# Patient Record
Sex: MALE | Race: WHITE | NOT HISPANIC OR LATINO
[De-identification: names, ages, dates, MRNs, and addresses within clinical notes are randomized per-mention and may not be internally consistent; named-entity substitution may affect disease eponyms.]

---

## 2017-04-26 ENCOUNTER — APPOINTMENT (OUTPATIENT)
Dept: ENDOCRINOLOGY | Facility: CLINIC | Age: 32
End: 2017-04-26

## 2018-09-04 ENCOUNTER — OUTPATIENT (OUTPATIENT)
Dept: OUTPATIENT SERVICES | Facility: HOSPITAL | Age: 33
LOS: 1 days | Discharge: STILL ADMITTED OR ADMINISTRATIVE | End: 2018-09-04

## 2018-09-05 DIAGNOSIS — E66.9 OBESITY, UNSPECIFIED: ICD-10-CM

## 2018-09-05 DIAGNOSIS — Z86.59 PERSONAL HISTORY OF OTHER MENTAL AND BEHAVIORAL DISORDERS: ICD-10-CM

## 2018-09-05 DIAGNOSIS — F33.9 MAJOR DEPRESSIVE DISORDER, RECURRENT, UNSPECIFIED: ICD-10-CM

## 2018-10-16 ENCOUNTER — EMERGENCY (EMERGENCY)
Facility: HOSPITAL | Age: 33
LOS: 1 days | Discharge: HOME | End: 2018-10-16
Attending: EMERGENCY MEDICINE | Admitting: EMERGENCY MEDICINE
Payer: MEDICAID

## 2018-10-16 VITALS
RESPIRATION RATE: 16 BRPM | SYSTOLIC BLOOD PRESSURE: 119 MMHG | DIASTOLIC BLOOD PRESSURE: 72 MMHG | OXYGEN SATURATION: 96 % | HEART RATE: 97 BPM | WEIGHT: 285.06 LBS | HEIGHT: 70 IN | TEMPERATURE: 99 F

## 2018-10-16 PROCEDURE — 99284 EMERGENCY DEPT VISIT MOD MDM: CPT

## 2018-10-16 PROCEDURE — 99281 EMR DPT VST MAYX REQ PHY/QHP: CPT

## 2018-10-16 NOTE — ED ADULT NURSE NOTE - OBJECTIVE STATEMENT
Patient requesting new prescription for Vyvanse 120mg BID. States he has been on this dosage for almost 4 years but his PCP is now declining to continue to prescribe in such high dosage. Patient states he has ADD and Depression that is well manage with current medication schedule and is concern of any change in schedule will affect his psychiatric well being.

## 2018-10-16 NOTE — ED ADULT NURSE NOTE - HPI (INCLUDE ILLNESS QUALITY, SEVERITY, DURATION, TIMING, CONTEXT, MODIFYING FACTORS, ASSOCIATED SIGNS AND SYMPTOMS)
Patient states he has ADD and Depression that is well manage with current medication schedule and is concern of any change in schedule will affect his psychiatric well being.

## 2018-10-16 NOTE — ED PROVIDER NOTE - PROGRESS NOTE DETAILS
Pt understands he must taper stimulants and get down to a proper dose before anyone will prescribe more. He will go to [facility] crisis center tomorrow to inquire about further outpt psych care. Refused telepsych consultation today.

## 2018-10-16 NOTE — ED PROVIDER NOTE - MEDICAL DECISION MAKING DETAILS
32 y/o male with PMHx of ADD, depression presents to the ED for a medication prescription of 240mg Vivace, 3 x 30mg Adderall. Referral back to PCP and Allen gardner.

## 2018-10-16 NOTE — ED PROVIDER NOTE - OBJECTIVE STATEMENT
32 y/o male with PMHx of ADD, depression presents to the ED for a medication prescription of 240mg Vivace, 3 x 30mg Adderall. Pt states he was seeing a psychologist for many years who was continuously increasing his dose of both vivace and Adderall, in 05/2018 he states he "ended his relationship" with this physician after they refused to renew his prescription. Pt states he has been trying to find a new psychiatrist since 05/2018 but no one is willing or comfortable to provide the high dosages of medication. Pt has been going to PCP in the meantime to receive his prescriptions but today this doctor told him he will no longer give him the medication and told him to come to the ED for evaluation. Pt went to [facility] for help tapering down the medication, but they told him they do not do tapering down for amphetamines. Pt found a psychiatrist Dr. Vail who said they have a tapering program but he has not tried to make an appointment yet. Pt states he has tried tapering down his medication on his own, but usually stops due to sx of feeling "foggy" and lethargic. Denies SI/HI or any other sx at this time. Nonsmoker. No ETOH.

## 2018-10-18 RX ORDER — DULOXETINE HYDROCHLORIDE 30 MG/1
1 CAPSULE, DELAYED RELEASE ORAL
Qty: 0 | Refills: 0 | COMMUNITY

## 2018-10-18 RX ORDER — DEXTROAMPHETAMINE SACCHARATE, AMPHETAMINE ASPARTATE, DEXTROAMPHETAMINE SULFATE AND AMPHETAMINE SULFATE 1.875; 1.875; 1.875; 1.875 MG/1; MG/1; MG/1; MG/1
0 TABLET ORAL
Qty: 0 | Refills: 0 | COMMUNITY

## 2018-10-18 RX ORDER — LISDEXAMFETAMINE DIMESYLATE 70 MG/1
60 CAPSULE ORAL
Qty: 0 | Refills: 0 | COMMUNITY

## 2018-10-18 RX ORDER — BUPROPION HYDROCHLORIDE 150 MG/1
1 TABLET, EXTENDED RELEASE ORAL
Qty: 0 | Refills: 0 | COMMUNITY

## 2018-10-18 RX ORDER — LISDEXAMFETAMINE DIMESYLATE 70 MG/1
120 CAPSULE ORAL
Qty: 0 | Refills: 0 | COMMUNITY

## 2020-05-14 NOTE — ED PROVIDER NOTE - NS ED MD DISPO DISCHARGE CCDA
Maulik rescheduled to 6/17/20 at 8:30am, arrive at 6:30am, 2nd fl day surgery,npo,needs a ride home,icloud and hn updated.   Patient/Caregiver provided printed discharge information.
